# Patient Record
Sex: FEMALE | Race: WHITE | NOT HISPANIC OR LATINO | Employment: FULL TIME | RURAL
[De-identification: names, ages, dates, MRNs, and addresses within clinical notes are randomized per-mention and may not be internally consistent; named-entity substitution may affect disease eponyms.]

---

## 2022-04-06 ENCOUNTER — CLINICAL SUPPORT (OUTPATIENT)
Dept: FAMILY MEDICINE | Facility: CLINIC | Age: 52
End: 2022-04-06
Payer: COMMERCIAL

## 2022-04-06 ENCOUNTER — OFFICE VISIT (OUTPATIENT)
Dept: FAMILY MEDICINE | Facility: CLINIC | Age: 52
End: 2022-04-06
Payer: COMMERCIAL

## 2022-04-06 VITALS
TEMPERATURE: 99 F | HEART RATE: 82 BPM | WEIGHT: 198.38 LBS | SYSTOLIC BLOOD PRESSURE: 122 MMHG | HEIGHT: 63 IN | DIASTOLIC BLOOD PRESSURE: 77 MMHG | OXYGEN SATURATION: 98 % | BODY MASS INDEX: 35.15 KG/M2

## 2022-04-06 DIAGNOSIS — Z11.1 SCREENING EXAMINATION FOR PULMONARY TUBERCULOSIS: Primary | ICD-10-CM

## 2022-04-06 DIAGNOSIS — Z02.1 ENCOUNTER FOR PRE-EMPLOYMENT EXAMINATION: Primary | ICD-10-CM

## 2022-04-06 PROCEDURE — 3008F BODY MASS INDEX DOCD: CPT | Mod: CPTII,,, | Performed by: FAMILY MEDICINE

## 2022-04-06 PROCEDURE — 99499 UNLISTED E&M SERVICE: CPT | Mod: ,,, | Performed by: FAMILY MEDICINE

## 2022-04-06 PROCEDURE — 3078F DIAST BP <80 MM HG: CPT | Mod: CPTII,,, | Performed by: FAMILY MEDICINE

## 2022-04-06 PROCEDURE — 1160F PR REVIEW ALL MEDS BY PRESCRIBER/CLIN PHARMACIST DOCUMENTED: ICD-10-PCS | Mod: CPTII,,, | Performed by: FAMILY MEDICINE

## 2022-04-06 PROCEDURE — 3074F SYST BP LT 130 MM HG: CPT | Mod: CPTII,,, | Performed by: FAMILY MEDICINE

## 2022-04-06 PROCEDURE — 3074F PR MOST RECENT SYSTOLIC BLOOD PRESSURE < 130 MM HG: ICD-10-PCS | Mod: CPTII,,, | Performed by: FAMILY MEDICINE

## 2022-04-06 PROCEDURE — 1159F PR MEDICATION LIST DOCUMENTED IN MEDICAL RECORD: ICD-10-PCS | Mod: CPTII,,, | Performed by: FAMILY MEDICINE

## 2022-04-06 PROCEDURE — 1159F MED LIST DOCD IN RCRD: CPT | Mod: CPTII,,, | Performed by: FAMILY MEDICINE

## 2022-04-06 PROCEDURE — 99499 PR PHYSICAL - BASIC NON DOT/CDL: ICD-10-PCS | Mod: ,,, | Performed by: FAMILY MEDICINE

## 2022-04-06 PROCEDURE — 3008F PR BODY MASS INDEX (BMI) DOCUMENTED: ICD-10-PCS | Mod: CPTII,,, | Performed by: FAMILY MEDICINE

## 2022-04-06 PROCEDURE — 3078F PR MOST RECENT DIASTOLIC BLOOD PRESSURE < 80 MM HG: ICD-10-PCS | Mod: CPTII,,, | Performed by: FAMILY MEDICINE

## 2022-04-06 PROCEDURE — 1160F RVW MEDS BY RX/DR IN RCRD: CPT | Mod: CPTII,,, | Performed by: FAMILY MEDICINE

## 2022-04-06 RX ORDER — LORATADINE 10 MG/1
10 TABLET ORAL DAILY PRN
COMMUNITY

## 2022-04-18 PROCEDURE — 86580 POCT TB SKIN TEST: ICD-10-PCS | Mod: ,,, | Performed by: FAMILY MEDICINE

## 2022-04-18 PROCEDURE — 86580 TB INTRADERMAL TEST: CPT | Mod: ,,, | Performed by: FAMILY MEDICINE

## 2022-04-25 NOTE — PROGRESS NOTES
Elan Blanton MD   [facility]  [address] [place] [address]     PATIENT NAME: Leny Desir  : 1970  DATE: 22  MRN: [id number]      Billing Provider: Elan Blanton MD  Level of Service: [place] OFFICE/OUTPT VISIT, EST, LEVL III, 20-29 MIN  Patient PCP Information     Provider PCP Type    Elan Blanton MD General          Reason for Visit / Chief Complaint: Employment Physical (Needs physical and TB skin test)         History of Present Illness / Problem Focused Workflow     Leny Desir presents to the clinic with Employment Physical (Needs physical and TB skin test)     HPI    Review of Systems     Review of Systems   Constitutional: Negative for activity change, appetite change, fatigue and fever.   HENT: Negative for nasal congestion, ear pain, hearing loss, sinus pressure/congestion and sore throat.    Respiratory: Negative for cough, chest tightness and shortness of breath.    Cardiovascular: Negative for chest pain and palpitations.   Gastrointestinal: Negative for abdominal pain and fecal incontinence.   Genitourinary: Negative for bladder incontinence and difficulty urinating.   Musculoskeletal: Negative for arthralgias.   Integumentary:  Negative for rash.   Neurological: Negative for dizziness and headaches.        Medical / Social / Family History     Past Medical History:   Diagnosis Date    Allergy        Past Surgical History:   Procedure Laterality Date    HYSTERECTOMY         Social History  Leny Desir  reports that she has never smoked. She has never used smokeless tobacco. She reports that she does not drink alcohol and does not use drugs.    Family History  Leny Desir  family history includes Diabetes in her father; Hypertension in her mother.    Medications and Allergies     Medications  No outpatient medications have been marked as taking for the 22 encounter (Office Visit) with Elan Blanton MD.       Allergies  Review  of patient's allergies indicates:   Allergen Reactions    Prednisone     Rocephin [ceftriaxone]     Sulfa (sulfonamide antibiotics)        Physical Examination     Vitals:    04/06/22 1455   BP: 122/77   Pulse: 82   Temp: 98.7 °F (37.1 °C)     Physical Exam  Constitutional:       General: She is not in acute distress.     Appearance: She is not ill-appearing.   HENT:      Head: Normocephalic and atraumatic.      Right Ear: Tympanic membrane and ear canal normal.      Left Ear: Tympanic membrane and ear canal normal.      Nose: Nose normal. No congestion or rhinorrhea.   Eyes:      Pupils: Pupils are equal, round, and reactive to light.   Cardiovascular:      Rate and Rhythm: Normal rate and regular rhythm.      Pulses: Normal pulses.      Heart sounds: No murmur heard.  Pulmonary:      Effort: No respiratory distress.      Breath sounds: No wheezing, rhonchi or rales.   Abdominal:      General: Bowel sounds are normal.      Palpations: Abdomen is soft.      Tenderness: There is no abdominal tenderness.      Hernia: No hernia is present.   Musculoskeletal:      Cervical back: Normal range of motion and neck supple.   Lymphadenopathy:      Cervical: No cervical adenopathy.   Skin:     General: Skin is warm and dry.   Neurological:      Mental Status: She is alert.   Psychiatric:         Behavior: Behavior normal.         Thought Content: Thought content normal.          Assessment and Plan (including Health Maintenance)   :    Plan:         Health Maintenance Due   Topic Date Due    Hepatitis C Screening  Never done    Lipid Panel  Never done    COVID-19 Vaccine (1) Never done    HIV Screening  Never done    TETANUS VACCINE  Never done    Mammogram  Never done    Colorectal Cancer Screening  Never done    Influenza Vaccine (1) Never done    Shingles Vaccine (1 of 2) Never done       Problem List Items Addressed This Visit    None     Visit Diagnoses     Encounter for pre-employment examination    -   Primary        Encounter for pre-employment examination       The patient has no Health Maintenance topics of status Not Due    Procedures     No future appointments.     No follow-ups on file.       Signature:  Elan Blanton MD  [facility]  [address] [place] [address]  [phone] Phone  [phone] Fax    Date of encounter: 4/6/22

## 2023-03-15 ENCOUNTER — HOSPITAL ENCOUNTER (EMERGENCY)
Facility: HOSPITAL | Age: 53
Discharge: HOME | End: 2023-03-15
Attending: PEDIATRICS
Payer: COMMERCIAL

## 2023-03-15 VITALS
WEIGHT: 198.44 LBS | HEIGHT: 63 IN | SYSTOLIC BLOOD PRESSURE: 132 MMHG | HEART RATE: 69 BPM | RESPIRATION RATE: 18 BRPM | TEMPERATURE: 98 F | BODY MASS INDEX: 35.16 KG/M2 | OXYGEN SATURATION: 97 % | DIASTOLIC BLOOD PRESSURE: 68 MMHG

## 2023-03-15 DIAGNOSIS — R10.9 RIGHT FLANK PAIN: Primary | ICD-10-CM

## 2023-03-15 DIAGNOSIS — K59.00 CONSTIPATION, UNSPECIFIED CONSTIPATION TYPE: ICD-10-CM

## 2023-03-15 LAB
ALBUMIN SERPL BCP-MCNC: 3.8 G/DL (ref 3.5–5)
ALBUMIN/GLOB SERPL: 0.8 {RATIO}
ALP SERPL-CCNC: 111 U/L (ref 41–108)
ALT SERPL W P-5'-P-CCNC: 27 U/L (ref 13–56)
ANION GAP SERPL CALCULATED.3IONS-SCNC: 15 MMOL/L (ref 7–16)
AST SERPL W P-5'-P-CCNC: 21 U/L (ref 15–37)
BACTERIA #/AREA URNS HPF: ABNORMAL /HPF
BASOPHILS # BLD AUTO: 0.05 K/UL (ref 0–0.2)
BASOPHILS NFR BLD AUTO: 0.4 % (ref 0–1)
BILIRUB SERPL-MCNC: 0.2 MG/DL (ref ?–1.2)
BILIRUB UR QL STRIP: NEGATIVE
BUN SERPL-MCNC: 20 MG/DL (ref 7–18)
BUN/CREAT SERPL: 24 (ref 6–20)
CALCIUM SERPL-MCNC: 9.6 MG/DL (ref 8.5–10.1)
CHLORIDE SERPL-SCNC: 100 MMOL/L (ref 98–107)
CLARITY UR: CLEAR
CO2 SERPL-SCNC: 29 MMOL/L (ref 21–32)
COLOR UR: YELLOW
CREAT SERPL-MCNC: 0.83 MG/DL (ref 0.55–1.02)
DIFFERENTIAL METHOD BLD: ABNORMAL
EGFR (NO RACE VARIABLE) (RUSH/TITUS): 85 ML/MIN/1.73M²
EOSINOPHIL # BLD AUTO: 0.65 K/UL (ref 0–0.5)
EOSINOPHIL NFR BLD AUTO: 5.1 % (ref 1–4)
ERYTHROCYTE [DISTWIDTH] IN BLOOD BY AUTOMATED COUNT: 12.6 % (ref 11.5–14.5)
GLOBULIN SER-MCNC: 4.7 G/DL (ref 2–4)
GLUCOSE SERPL-MCNC: 76 MG/DL (ref 74–106)
GLUCOSE UR STRIP-MCNC: NEGATIVE MG/DL
HCT VFR BLD AUTO: 42.7 % (ref 38–47)
HGB BLD-MCNC: 14.1 G/DL (ref 12–16)
IMM GRANULOCYTES # BLD AUTO: 0.02 K/UL (ref 0–0.04)
IMM GRANULOCYTES NFR BLD: 0.2 % (ref 0–0.4)
KETONES UR STRIP-SCNC: NEGATIVE MG/DL
LEUKOCYTE ESTERASE UR QL STRIP: NEGATIVE
LYMPHOCYTES # BLD AUTO: 4.49 K/UL (ref 1–4.8)
LYMPHOCYTES NFR BLD AUTO: 35 % (ref 27–41)
MCH RBC QN AUTO: 29.7 PG (ref 27–31)
MCHC RBC AUTO-ENTMCNC: 33 G/DL (ref 32–36)
MCV RBC AUTO: 89.9 FL (ref 80–96)
MONOCYTES # BLD AUTO: 1.15 K/UL (ref 0–0.8)
MONOCYTES NFR BLD AUTO: 9 % (ref 2–6)
MPC BLD CALC-MCNC: 9.9 FL (ref 9.4–12.4)
MUCOUS THREADS #/AREA URNS HPF: ABNORMAL /HPF
NEUTROPHILS # BLD AUTO: 6.47 K/UL (ref 1.8–7.7)
NEUTROPHILS NFR BLD AUTO: 50.3 % (ref 53–65)
NITRITE UR QL STRIP: NEGATIVE
PH UR STRIP: 5.5 PH UNITS
PLATELET # BLD AUTO: 375 K/UL (ref 150–400)
POTASSIUM SERPL-SCNC: 3 MMOL/L (ref 3.5–5.1)
PROT SERPL-MCNC: 8.5 G/DL (ref 6.4–8.2)
PROT UR QL STRIP: NEGATIVE
RBC # BLD AUTO: 4.75 M/UL (ref 4.2–5.4)
RBC # UR STRIP: ABNORMAL /UL
RBC #/AREA URNS HPF: ABNORMAL /HPF
SODIUM SERPL-SCNC: 141 MMOL/L (ref 136–145)
SP GR UR STRIP: >=1.03
SQUAMOUS #/AREA URNS LPF: ABNORMAL /LPF
UROBILINOGEN UR STRIP-ACNC: 0.2 MG/DL
WBC # BLD AUTO: 12.83 K/UL (ref 4.5–11)
WBC #/AREA URNS HPF: ABNORMAL /HPF

## 2023-03-15 PROCEDURE — 80053 COMPREHEN METABOLIC PANEL: CPT | Performed by: PEDIATRICS

## 2023-03-15 PROCEDURE — 96375 TX/PRO/DX INJ NEW DRUG ADDON: CPT

## 2023-03-15 PROCEDURE — 99285 EMERGENCY DEPT VISIT HI MDM: CPT | Mod: 25

## 2023-03-15 PROCEDURE — 85025 COMPLETE CBC W/AUTO DIFF WBC: CPT | Performed by: PEDIATRICS

## 2023-03-15 PROCEDURE — 99284 PR EMERGENCY DEPT VISIT,LEVEL IV: ICD-10-PCS | Mod: ,,, | Performed by: PEDIATRICS

## 2023-03-15 PROCEDURE — 96374 THER/PROPH/DIAG INJ IV PUSH: CPT

## 2023-03-15 PROCEDURE — 63600175 PHARM REV CODE 636 W HCPCS: Mod: TB,JG | Performed by: PEDIATRICS

## 2023-03-15 PROCEDURE — 81001 URINALYSIS AUTO W/SCOPE: CPT | Performed by: PEDIATRICS

## 2023-03-15 PROCEDURE — 99284 EMERGENCY DEPT VISIT MOD MDM: CPT | Mod: ,,, | Performed by: PEDIATRICS

## 2023-03-15 RX ORDER — KETOROLAC TROMETHAMINE 30 MG/ML
30 INJECTION, SOLUTION INTRAMUSCULAR; INTRAVENOUS
Status: COMPLETED | OUTPATIENT
Start: 2023-03-15 | End: 2023-03-15

## 2023-03-15 RX ORDER — DIPHENHYDRAMINE HCL 25 MG
CAPSULE ORAL
COMMUNITY

## 2023-03-15 RX ORDER — MORPHINE SULFATE 2 MG/ML
1 INJECTION, SOLUTION INTRAMUSCULAR; INTRAVENOUS
Status: COMPLETED | OUTPATIENT
Start: 2023-03-15 | End: 2023-03-15

## 2023-03-15 RX ORDER — SOY ISOFLAV/BCOHOSH/CISSUS QUA 198 MG
CAPSULE ORAL
COMMUNITY

## 2023-03-15 RX ADMIN — KETOROLAC TROMETHAMINE 30 MG: 30 INJECTION, SOLUTION INTRAMUSCULAR; INTRAVENOUS at 07:03

## 2023-03-15 RX ADMIN — MORPHINE SULFATE 1 MG: 2 INJECTION, SOLUTION INTRAMUSCULAR; INTRAVENOUS at 08:03

## 2023-03-16 NOTE — ED TRIAGE NOTES
Presented to ER with right flank pain for past 8 days. Reports worse today and has a history of kidney stones.

## 2023-03-16 NOTE — ED PROVIDER NOTES
Encounter Date: 3/15/2023       History     Chief Complaint   Patient presents with    possible kidney stone    Flank Pain     Patient reports she is got a history of still kidney stones and passes a few a year.  She reports her stones are typically on the right.  Her symptoms currently are flank pain which is consistent with her stone history.  She is deny any blood in her urine that she is been able to see.  No vomiting no diarrhea no changing urinary habits.  No fevers.    Review of patient's allergies indicates:   Allergen Reactions    Prednisone     Rocephin [ceftriaxone]     Sulfa (sulfonamide antibiotics)      Past Medical History:   Diagnosis Date    Allergy      Past Surgical History:   Procedure Laterality Date    HYSTERECTOMY       Family History   Problem Relation Age of Onset    Hypertension Mother     Diabetes Father      Social History     Tobacco Use    Smoking status: Never    Smokeless tobacco: Never   Substance Use Topics    Alcohol use: Never    Drug use: Never     Review of Systems   Constitutional:  Negative for fever.   Respiratory:  Negative for cough, chest tightness and shortness of breath.    Gastrointestinal:  Negative for abdominal pain, diarrhea, nausea and vomiting.   Genitourinary:  Positive for flank pain.   All other systems reviewed and are negative.    Physical Exam     Initial Vitals [03/15/23 1910]   BP Pulse Resp Temp SpO2   (!) 149/78 78 20 98.1 °F (36.7 °C) 100 %      MAP       --         Physical Exam    Nursing note and vitals reviewed.  Constitutional: She appears well-developed and well-nourished. No distress.   Cardiovascular:  Normal rate, regular rhythm, normal heart sounds and intact distal pulses.           No murmur heard.  Pulmonary/Chest: Breath sounds normal. No respiratory distress.   Abdominal: Abdomen is soft. Bowel sounds are normal. She exhibits no distension. There is no rebound.     Neurological: She is alert and oriented to person, place, and time.    Skin: Skin is warm and dry. Capillary refill takes less than 2 seconds.   Psychiatric: She has a normal mood and affect. Her behavior is normal. Judgment and thought content normal.       Medical Screening Exam   See Full Note    ED Course   Procedures  Labs Reviewed   URINALYSIS, REFLEX TO URINE CULTURE - Abnormal; Notable for the following components:       Result Value    Blood, UA Trace-Intact (*)     Specific Gravity, UA >=1.030 (*)     All other components within normal limits   URINALYSIS, MICROSCOPIC - Abnormal; Notable for the following components:    Bacteria, UA Few (*)     Squamous Epithelial Cells, UA Moderate (*)     Mucus, UA Rare (*)     All other components within normal limits   COMPREHENSIVE METABOLIC PANEL - Abnormal; Notable for the following components:    Potassium 3.0 (*)     BUN 20 (*)     BUN/Creatinine Ratio 24 (*)     Total Protein 8.5 (*)     Globulin 4.7 (*)     Alk Phos 111 (*)     All other components within normal limits   CBC WITH DIFFERENTIAL - Abnormal; Notable for the following components:    WBC 12.83 (*)     Neutrophils % 50.3 (*)     Monocytes % 9.0 (*)     Eosinophils % 5.1 (*)     Monocytes, Absolute 1.15 (*)     Eosinophils, Absolute 0.65 (*)     All other components within normal limits   CBC W/ AUTO DIFFERENTIAL    Narrative:     The following orders were created for panel order CBC auto differential.  Procedure                               Abnormality         Status                     ---------                               -----------         ------                     CBC with Differential[[id number]]        Abnormal            Final result                 Please view results for these tests on the individual orders.          Imaging Results              CT Renal Stone Study ABD Pelvis WO (Final result)  Result time 03/15/23 20:04:48      Final result by Waylon Villela MD (03/15/23 20:04:48)                   Impression:      Nonobstructing left  nephrolithiasis    No hydronephrosis.  No radiopaque ureteral stone    Diverticulosis of the colon without tono diverticulitis      Electronically signed by: Waylon Larryjocelyne  Date:    03/15/2023  Time:    20:04               Narrative:    EXAMINATION:  CT ABDOMEN AND PELVIS without contrast    CLINICAL HISTORY:  Flank pain.  Kidney stone suspected    TECHNIQUE:  Axial CT images were obtained through the abdomen and pelvis without IV contrast.  Oral contrast was not given.  Coronal and sagittal reconstructions submitted and interpreted.  Total DLP  mGycm.  Automated exposure control utilized.    COMPARISON:  No previous similar CT available    FINDINGS:  CT abdomen:    Partially visualized lung bases are clear.  There is no gross pleural or pericardial effusion.    There is no evidence of pneumoperitoneum    Liver, bile ducts, spleen, pancreas, adrenal glands, and gallbladder are unremarkable in noncontrast CT appearance.    There is no hydronephrosis.  There is no radiopaque renal or ureteral stone on the right.  There are multiple punctate nonobstructing left renal stones.    There is no aneurysm of the abdominal aorta.    Stomach is moderately distended with ingested material.  Appendix is identified and appears normal.  There is no tono bowel obstruction.  There is diverticulosis of the colon without tono diverticulitis.  There is mild to moderate retained stool in the colon.    There is no tono lymphadenopathy by short-axis diameter criteria.    CT pelvis:    Urinary bladder is mildly distended.  There is no pelvic mass.  There has been apparent prior hysterectomy.    No acute osseous findings.                                       Medications   ketorolac injection 30 mg (30 mg Intravenous Given 3/15/23 1922)   morphine injection 1 mg (1 mg Intravenous Given 3/15/23 2010)     Medical Decision Making:   History:   I obtained history from: someone other than patient.       <> Summary of History: Patient has  been help with this story.  Patient reports flank pain  Initial Assessment:   Flank pain  Differential Diagnosis:   Kidney stone, constipation, diverticulitis, appendicitis,  Clinical Tests:   Lab Tests: Ordered and Reviewed  Radiological Study: Ordered and Reviewed  ED Management:  Patient given medication for the pain.  CT was unremarkable.  Patient advised on treatment of constipation to see if this helped with her flank pain                 Clinical Impression:   Final diagnoses:  [R10.9] Right flank pain (Primary)  [K59.00] Constipation, unspecified constipation type        ED Disposition Condition    Discharge Stable          ED Prescriptions    None       Follow-up Information       Follow up With Specialties Details Why Contact Info    Elan Blanton MD Family Medicine In 3 days  [address] [facility] [address]  [phone]               Flo Londono MD  03/15/23 7292       Flo Londono MD  04/15/23 1018

## 2023-03-30 ENCOUNTER — OFFICE VISIT (OUTPATIENT)
Dept: FAMILY MEDICINE | Facility: CLINIC | Age: 53
End: 2023-03-30
Payer: COMMERCIAL

## 2023-03-30 VITALS
SYSTOLIC BLOOD PRESSURE: 104 MMHG | WEIGHT: 195.38 LBS | BODY MASS INDEX: 34.62 KG/M2 | OXYGEN SATURATION: 97 % | HEART RATE: 74 BPM | HEIGHT: 63 IN | TEMPERATURE: 98 F | DIASTOLIC BLOOD PRESSURE: 78 MMHG

## 2023-03-30 DIAGNOSIS — I10 HYPERTENSION, UNSPECIFIED TYPE: ICD-10-CM

## 2023-03-30 DIAGNOSIS — Z11.1 SCREENING FOR TUBERCULOSIS: ICD-10-CM

## 2023-03-30 DIAGNOSIS — Z12.11 COLON CANCER SCREENING: ICD-10-CM

## 2023-03-30 DIAGNOSIS — L65.9 HAIR LOSS: Primary | ICD-10-CM

## 2023-03-30 DIAGNOSIS — Z13.220 LIPID SCREENING: ICD-10-CM

## 2023-03-30 DIAGNOSIS — Z13.1 SCREENING FOR DIABETES MELLITUS: ICD-10-CM

## 2023-03-30 LAB
ANION GAP SERPL CALCULATED.3IONS-SCNC: 10 MMOL/L (ref 7–16)
BUN SERPL-MCNC: 15 MG/DL (ref 7–18)
BUN/CREAT SERPL: 24 (ref 6–20)
CALCIUM SERPL-MCNC: 10.1 MG/DL (ref 8.5–10.1)
CHLORIDE SERPL-SCNC: 104 MMOL/L (ref 98–107)
CHOLEST SERPL-MCNC: 220 MG/DL (ref 0–200)
CHOLEST/HDLC SERPL: 3 {RATIO}
CO2 SERPL-SCNC: 28 MMOL/L (ref 21–32)
CREAT SERPL-MCNC: 0.62 MG/DL (ref 0.55–1.02)
EGFR (NO RACE VARIABLE) (RUSH/TITUS): 108 ML/MIN/1.73M²
GLUCOSE SERPL-MCNC: 100 MG/DL (ref 74–106)
HDLC SERPL-MCNC: 74 MG/DL (ref 40–60)
LDLC SERPL CALC-MCNC: 132 MG/DL
LDLC/HDLC SERPL: 1.8 {RATIO}
NONHDLC SERPL-MCNC: 146 MG/DL
POTASSIUM SERPL-SCNC: 4.3 MMOL/L (ref 3.5–5.1)
SODIUM SERPL-SCNC: 138 MMOL/L (ref 136–145)
T4 FREE SERPL-MCNC: 0.92 NG/DL (ref 0.76–1.46)
TRIGL SERPL-MCNC: 69 MG/DL (ref 35–150)
TSH SERPL DL<=0.005 MIU/L-ACNC: 1.6 UIU/ML (ref 0.36–3.74)
VLDLC SERPL-MCNC: 14 MG/DL

## 2023-03-30 PROCEDURE — 3008F BODY MASS INDEX DOCD: CPT | Mod: CPTII,,, | Performed by: FAMILY MEDICINE

## 2023-03-30 PROCEDURE — 80061 LIPID PANEL: ICD-10-PCS | Mod: ,,, | Performed by: CLINICAL MEDICAL LABORATORY

## 2023-03-30 PROCEDURE — 3078F DIAST BP <80 MM HG: CPT | Mod: CPTII,,, | Performed by: FAMILY MEDICINE

## 2023-03-30 PROCEDURE — 1159F MED LIST DOCD IN RCRD: CPT | Mod: CPTII,,, | Performed by: FAMILY MEDICINE

## 2023-03-30 PROCEDURE — 84439 ASSAY OF FREE THYROXINE: CPT | Mod: ,,, | Performed by: CLINICAL MEDICAL LABORATORY

## 2023-03-30 PROCEDURE — 80048 BASIC METABOLIC PANEL: ICD-10-PCS | Mod: ,,, | Performed by: CLINICAL MEDICAL LABORATORY

## 2023-03-30 PROCEDURE — 3044F PR MOST RECENT HEMOGLOBIN A1C LEVEL <7.0%: ICD-10-PCS | Mod: CPTII,,, | Performed by: FAMILY MEDICINE

## 2023-03-30 PROCEDURE — 86580 TB INTRADERMAL TEST: CPT | Mod: ,,, | Performed by: FAMILY MEDICINE

## 2023-03-30 PROCEDURE — 99214 PR OFFICE/OUTPT VISIT, EST, LEVL IV, 30-39 MIN: ICD-10-PCS | Mod: ,,, | Performed by: FAMILY MEDICINE

## 2023-03-30 PROCEDURE — 84439 T4, FREE: ICD-10-PCS | Mod: ,,, | Performed by: CLINICAL MEDICAL LABORATORY

## 2023-03-30 PROCEDURE — 80048 BASIC METABOLIC PNL TOTAL CA: CPT | Mod: ,,, | Performed by: CLINICAL MEDICAL LABORATORY

## 2023-03-30 PROCEDURE — 1159F PR MEDICATION LIST DOCUMENTED IN MEDICAL RECORD: ICD-10-PCS | Mod: CPTII,,, | Performed by: FAMILY MEDICINE

## 2023-03-30 PROCEDURE — 86580 POCT TB SKIN TEST: ICD-10-PCS | Mod: ,,, | Performed by: FAMILY MEDICINE

## 2023-03-30 PROCEDURE — 3008F PR BODY MASS INDEX (BMI) DOCUMENTED: ICD-10-PCS | Mod: CPTII,,, | Performed by: FAMILY MEDICINE

## 2023-03-30 PROCEDURE — 84443 TSH: ICD-10-PCS | Mod: ,,, | Performed by: CLINICAL MEDICAL LABORATORY

## 2023-03-30 PROCEDURE — 80061 LIPID PANEL: CPT | Mod: ,,, | Performed by: CLINICAL MEDICAL LABORATORY

## 2023-03-30 PROCEDURE — 3074F SYST BP LT 130 MM HG: CPT | Mod: CPTII,,, | Performed by: FAMILY MEDICINE

## 2023-03-30 PROCEDURE — 3074F PR MOST RECENT SYSTOLIC BLOOD PRESSURE < 130 MM HG: ICD-10-PCS | Mod: CPTII,,, | Performed by: FAMILY MEDICINE

## 2023-03-30 PROCEDURE — 3044F HG A1C LEVEL LT 7.0%: CPT | Mod: CPTII,,, | Performed by: FAMILY MEDICINE

## 2023-03-30 PROCEDURE — 83036 HEMOGLOBIN GLYCOSYLATED A1C: CPT | Mod: ,,, | Performed by: CLINICAL MEDICAL LABORATORY

## 2023-03-30 PROCEDURE — 83036 HEMOGLOBIN A1C: ICD-10-PCS | Mod: ,,, | Performed by: CLINICAL MEDICAL LABORATORY

## 2023-03-30 PROCEDURE — 3078F PR MOST RECENT DIASTOLIC BLOOD PRESSURE < 80 MM HG: ICD-10-PCS | Mod: CPTII,,, | Performed by: FAMILY MEDICINE

## 2023-03-30 PROCEDURE — 99214 OFFICE O/P EST MOD 30 MIN: CPT | Mod: ,,, | Performed by: FAMILY MEDICINE

## 2023-03-30 PROCEDURE — 84443 ASSAY THYROID STIM HORMONE: CPT | Mod: ,,, | Performed by: CLINICAL MEDICAL LABORATORY

## 2023-03-30 NOTE — PROGRESS NOTES
Elan Blanton MD   [facility]  [address] [place] [address]     PATIENT NAME: Leny Desir  : 1970  DATE: 3/30/23  MRN: [id number]      Billing Provider: Elan Blanton MD  Level of Service: [place] OFFICE/OUTPT VISIT, EST, LEVL IV, 30-39 MIN  Patient PCP Information       Provider PCP Type    Elan Blanton MD General            Reason for Visit / Chief Complaint: Annual Exam (Health Physical. ), Hypertension (Discuss BP readings. Headache started a couple of days before ER Visit on 3/15/23 for right sided flank pain. BP readings monitored and readings fluctuating. Patient reports K+- 3.0 at ER visit. Taking OTC Potassium since ER visit. ), and Hair Loss (Hair loss and concerned of thyroid. )         History of Present Illness / Problem Focused Workflow     Leny Desir presents to the clinic with Annual Exam (Health Physical. ), Hypertension (Discuss BP readings. Headache started a couple of days before ER Visit on 3/15/23 for right sided flank pain. BP readings monitored and readings fluctuating. Patient reports K+- 3.0 at ER visit. Taking OTC Potassium since ER visit. ), and Hair Loss (Hair loss and concerned of thyroid. )     HPI    Review of Systems     Review of Systems   Constitutional:  Negative for activity change, appetite change, fatigue and fever.   HENT:  Negative for nasal congestion, ear pain, hearing loss, sinus pressure/congestion and sore throat.    Respiratory:  Negative for cough, chest tightness and shortness of breath.    Cardiovascular:  Negative for chest pain and palpitations.   Gastrointestinal:  Negative for abdominal pain and fecal incontinence.   Genitourinary:  Negative for bladder incontinence and difficulty urinating.   Musculoskeletal:  Negative for arthralgias.   Integumentary:  Negative for rash.   Neurological:  Negative for dizziness and headaches.      Medical / Social / Family History     Past Medical History:   Diagnosis  Date    Allergy        Past Surgical History:   Procedure Laterality Date    HYSTERECTOMY      TUBAL LIGATION         Social History  Leny Desir  reports that she has never smoked. She has never used smokeless tobacco. She reports that she does not drink alcohol and does not use drugs.    Family History  Leny Desir  family history includes Diabetes in her father; Hypertension in her mother.    Medications and Allergies     Medications  Outpatient Medications Marked as Taking for the 3/30/23 encounter (Office Visit) with Elan Blanton MD   Medication Sig Dispense Refill    diphenhydrAMINE (BENADRYL) 25 mg capsule       loratadine (CLARITIN) 10 mg tablet Take 10 mg by mouth daily as needed for Allergies.      rhubarb root extract (ESTROVEN CMPLT MENOPAUSE RLF) 4 mg Tab          Allergies  Review of patient's allergies indicates:   Allergen Reactions    Prednisone     Rocephin [ceftriaxone]     Sulfa (sulfonamide antibiotics)        Physical Examination     Vitals:    03/30/23 0901   BP: 104/78   Pulse: 74   Temp: 98.3 °F (36.8 °C)     Physical Exam  Constitutional:       General: She is not in acute distress.     Appearance: She is not ill-appearing.   HENT:      Head: Normocephalic and atraumatic.      Right Ear: Tympanic membrane and ear canal normal.      Left Ear: Tympanic membrane and ear canal normal.      Nose: Nose normal. No congestion or rhinorrhea.   Eyes:      Pupils: Pupils are equal, round, and reactive to light.   Cardiovascular:      Rate and Rhythm: Normal rate and regular rhythm.      Pulses: Normal pulses.      Heart sounds: No murmur heard.  Pulmonary:      Effort: No respiratory distress.      Breath sounds: No wheezing, rhonchi or rales.   Abdominal:      General: Bowel sounds are normal.      Palpations: Abdomen is soft.      Tenderness: There is no abdominal tenderness.      Hernia: No hernia is present.   Musculoskeletal:      Cervical back: Normal range of motion and neck supple.    Lymphadenopathy:      Cervical: No cervical adenopathy.   Skin:     General: Skin is warm and dry.   Neurological:      Mental Status: She is alert.   Psychiatric:         Behavior: Behavior normal.         Thought Content: Thought content normal.        Assessment and Plan (including Health Maintenance)   :    Plan:         Health Maintenance Due   Topic Date Due    Hepatitis C Screening  Never done    COVID-19 Vaccine (1) Never done    HIV Screening  Never done    Mammogram  Never done    Colorectal Cancer Screening  Never done    Shingles Vaccine (1 of 2) Never done    Influenza Vaccine (1) Never done       Problem List Items Addressed This Visit    None  Visit Diagnoses       Hair loss    -  Primary    Relevant Orders    T4, Free (Completed)    TSH (Completed)    Lipid screening        Relevant Orders    Lipid Panel (Completed)    Screening for diabetes mellitus        Relevant Orders    Hemoglobin A1C (Completed)    Hypertension, unspecified type        Relevant Orders    Basic Metabolic Panel (Completed)    Colon cancer screening        Relevant Orders    Cologuard Screening (Multitarget Stool DNA)    Screening for tuberculosis        Relevant Orders    POCT TB Skin Test Read (Completed)          Hair loss  -     T4, Free; Future; Expected date: 03/30/2023  -     TSH; Future; Expected date: 03/30/2023    Lipid screening  -     Lipid Panel; Future; Expected date: 03/30/2023    Screening for diabetes mellitus  -     Hemoglobin A1C; Future; Expected date: 03/30/2023    Hypertension, unspecified type  -     Basic Metabolic Panel; Future; Expected date: 03/30/2023    Colon cancer screening  -     Cologuard Screening (Multitarget Stool DNA); Future; Expected date: 03/30/2023    Screening for tuberculosis  -     POCT TB Skin Test Read       Health Maintenance Topics with due status: Not Due       Topic Last Completion Date    TETANUS VACCINE 12/22/2020    Hemoglobin A1c (Diabetic Prevention Screening) 03/30/2023     Lipid Panel 03/30/2023       Procedures     No future appointments.     No follow-ups on file.       Signature:  Elan Blanton MD  [facility]  [address] [place] [address]  [phone] Phone  [phone] Fax    Date of encounter: 3/30/23

## 2023-03-30 NOTE — LETTER
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 The results of this test are as follows:    PPD Test Value    No lab values to display.       Current PPD Immunization     Name Date Dose VIS Date Route    PPD Test 3/30/2023 0.1 mL N/A Intradermal    Site: Left arm    Given By: Justine Maldonado LPN    : [employer]    Lot: [id number]    Expiration Date: 6/1/2025    PPD Test 4/18/2022 0.1 mL N/A Intradermal    Site: Left arm    Given By: Justine Maldonado LPN    : [employer]    Lot: [id number]    Expiration Date: 3/17/2023       {was/not:[id number]::"was not"}  If you have any questions or concerns, please don't hesitate to call.    Sincerely,    Elan Blanton MD     "

## 2023-03-30 NOTE — PROGRESS NOTES
Elan Blanton MD   [facility]  [address] [place] [address]     PATIENT NAME: Leny Desir  : 1970  DATE: 3/30/23  MRN: [id number]      Billing Provider: Elan Blanton MD  Level of Service:   Patient PCP Information       Provider PCP Type    Elan Blanton MD General            Reason for Visit / Chief Complaint: Annual Exam (Health Physical. ), Hypertension (Discuss BP readings. Headache started a couple of days before ER Visit on 3/15/23 for right sided flank pain. BP readings monitored and readings fluctuating. Patient reports K+- 3.0 at ER visit. Taking OTC Potassium since ER visit. ), and Hair Loss (Hair loss and concerned of thyroid. )         History of Present Illness / Problem Focused Workflow     Leny Desir presents to the clinic with Annual Exam (Health Physical. ), Hypertension (Discuss BP readings. Headache started a couple of days before ER Visit on 3/15/23 for right sided flank pain. BP readings monitored and readings fluctuating. Patient reports K+- 3.0 at ER visit. Taking OTC Potassium since ER visit. ), and Hair Loss (Hair loss and concerned of thyroid. )     HPI    Review of Systems     Review of Systems     Medical / Social / Family History     Past Medical History:   Diagnosis Date    Allergy        Past Surgical History:   Procedure Laterality Date    HYSTERECTOMY      TUBAL LIGATION         Social History  Leny Desir  reports that she has never smoked. She has never used smokeless tobacco. She reports that she does not drink alcohol and does not use drugs.    Family History  Leny Desir  family history includes Diabetes in her father; Hypertension in her mother.    Medications and Allergies     Medications  Outpatient Medications Marked as Taking for the 3/30/23 encounter (Office Visit) with Elan Blanton MD   Medication Sig Dispense Refill    diphenhydrAMINE (BENADRYL) 25 mg capsule       loratadine (CLARITIN) 10 mg  tablet Take 10 mg by mouth daily as needed for Allergies.      rhubarb root extract (ESTROVEN CMPLT MENOPAUSE RLF) 4 mg Tab          Allergies  Review of patient's allergies indicates:   Allergen Reactions    Prednisone     Rocephin [ceftriaxone]     Sulfa (sulfonamide antibiotics)        Physical Examination     Vitals:    03/30/23 0901   BP: 104/78   Pulse: 74   Temp: 98.3 °F (36.8 °C)     Physical Exam     Assessment and Plan (including Health Maintenance)   :    Plan:         Health Maintenance Due   Topic Date Due    Hepatitis C Screening  Never done    Lipid Panel  Never done    COVID-19 Vaccine (1) Never done    HIV Screening  Never done    Mammogram  Never done    Hemoglobin A1c (Diabetic Prevention Screening)  Never done    Colorectal Cancer Screening  Never done    Shingles Vaccine (1 of 2) Never done    Influenza Vaccine (1) Never done       Problem List Items Addressed This Visit    None  Visit Diagnoses       Hair loss    -  Primary    Relevant Orders    T4, Free    TSH    Lipid screening        Relevant Orders    Lipid Panel    Screening for diabetes mellitus        Relevant Orders    Hemoglobin A1C    Hypertension, unspecified type        Relevant Orders    Basic Metabolic Panel    Colon cancer screening        Relevant Orders    Cologuard Screening (Multitarget Stool DNA)          Hair loss  -     T4, Free; Future; Expected date: 03/30/2023  -     TSH; Future; Expected date: 03/30/2023    Lipid screening  -     Lipid Panel; Future; Expected date: 03/30/2023    Screening for diabetes mellitus  -     Hemoglobin A1C; Future; Expected date: 03/30/2023    Hypertension, unspecified type  -     Basic Metabolic Panel; Future; Expected date: 03/30/2023    Colon cancer screening  -     Cologuard Screening (Multitarget Stool DNA); Future; Expected date: 03/30/2023       Health Maintenance Topics with due status: Not Due       Topic Last Completion Date    TETANUS VACCINE 12/22/2020       Procedures     No  future appointments.     No follow-ups on file.       Signature:  Elan Blanton MD  [facility]  [address] [place] [address]  [phone] Phone  [phone] Fax    Date of encounter: 3/30/23

## 2023-03-31 LAB
EST. AVERAGE GLUCOSE BLD GHB EST-MCNC: 100 MG/DL
HBA1C MFR BLD HPLC: 5.6 % (ref 4.5–6.6)

## 2023-04-15 LAB — NONINV COLON CA DNA+OCC BLD SCRN STL QL: NEGATIVE

## 2023-06-08 ENCOUNTER — OFFICE VISIT (OUTPATIENT)
Dept: FAMILY MEDICINE | Facility: CLINIC | Age: 53
End: 2023-06-08
Payer: COMMERCIAL

## 2023-06-08 VITALS
SYSTOLIC BLOOD PRESSURE: 122 MMHG | TEMPERATURE: 99 F | WEIGHT: 202 LBS | HEART RATE: 95 BPM | BODY MASS INDEX: 35.79 KG/M2 | OXYGEN SATURATION: 82 % | DIASTOLIC BLOOD PRESSURE: 84 MMHG | HEIGHT: 63 IN

## 2023-06-08 DIAGNOSIS — R05.9 COUGH, UNSPECIFIED TYPE: ICD-10-CM

## 2023-06-08 DIAGNOSIS — J01.00 ACUTE NON-RECURRENT MAXILLARY SINUSITIS: Primary | ICD-10-CM

## 2023-06-08 PROCEDURE — 3074F SYST BP LT 130 MM HG: CPT | Mod: CPTII,,, | Performed by: FAMILY MEDICINE

## 2023-06-08 PROCEDURE — 3044F HG A1C LEVEL LT 7.0%: CPT | Mod: CPTII,,, | Performed by: FAMILY MEDICINE

## 2023-06-08 PROCEDURE — 99213 OFFICE O/P EST LOW 20 MIN: CPT | Mod: 25,,, | Performed by: FAMILY MEDICINE

## 2023-06-08 PROCEDURE — 1159F MED LIST DOCD IN RCRD: CPT | Mod: CPTII,,, | Performed by: FAMILY MEDICINE

## 2023-06-08 PROCEDURE — 3044F PR MOST RECENT HEMOGLOBIN A1C LEVEL <7.0%: ICD-10-PCS | Mod: CPTII,,, | Performed by: FAMILY MEDICINE

## 2023-06-08 PROCEDURE — 3079F DIAST BP 80-89 MM HG: CPT | Mod: CPTII,,, | Performed by: FAMILY MEDICINE

## 2023-06-08 PROCEDURE — 1159F PR MEDICATION LIST DOCUMENTED IN MEDICAL RECORD: ICD-10-PCS | Mod: CPTII,,, | Performed by: FAMILY MEDICINE

## 2023-06-08 PROCEDURE — 3074F PR MOST RECENT SYSTOLIC BLOOD PRESSURE < 130 MM HG: ICD-10-PCS | Mod: CPTII,,, | Performed by: FAMILY MEDICINE

## 2023-06-08 PROCEDURE — 96372 THER/PROPH/DIAG INJ SC/IM: CPT | Mod: ,,, | Performed by: FAMILY MEDICINE

## 2023-06-08 PROCEDURE — 3079F PR MOST RECENT DIASTOLIC BLOOD PRESSURE 80-89 MM HG: ICD-10-PCS | Mod: CPTII,,, | Performed by: FAMILY MEDICINE

## 2023-06-08 PROCEDURE — 99213 PR OFFICE/OUTPT VISIT, EST, LEVL III, 20-29 MIN: ICD-10-PCS | Mod: 25,,, | Performed by: FAMILY MEDICINE

## 2023-06-08 PROCEDURE — 96372 PR INJECTION,THERAP/PROPH/DIAG2ST, IM OR SUBCUT: ICD-10-PCS | Mod: ,,, | Performed by: FAMILY MEDICINE

## 2023-06-08 PROCEDURE — 3008F BODY MASS INDEX DOCD: CPT | Mod: CPTII,,, | Performed by: FAMILY MEDICINE

## 2023-06-08 PROCEDURE — 3008F PR BODY MASS INDEX (BMI) DOCUMENTED: ICD-10-PCS | Mod: CPTII,,, | Performed by: FAMILY MEDICINE

## 2023-06-08 RX ORDER — LINCOMYCIN HYDROCHLORIDE 300 MG/ML
600 INJECTION, SOLUTION INTRAMUSCULAR; INTRAVENOUS; SUBCONJUNCTIVAL
Status: COMPLETED | OUTPATIENT
Start: 2023-06-08 | End: 2023-06-08

## 2023-06-08 RX ORDER — FLUTICASONE PROPIONATE 50 MCG
1 SPRAY, SUSPENSION (ML) NASAL DAILY
Qty: 16 G | Refills: 0 | Status: SHIPPED | OUTPATIENT
Start: 2023-06-08

## 2023-06-08 RX ORDER — AZITHROMYCIN 250 MG/1
TABLET, FILM COATED ORAL
Qty: 6 TABLET | Refills: 0 | Status: SHIPPED | OUTPATIENT
Start: 2023-06-08

## 2023-06-08 RX ADMIN — LINCOMYCIN HYDROCHLORIDE 600 MG: 300 INJECTION, SOLUTION INTRAMUSCULAR; INTRAVENOUS; SUBCONJUNCTIVAL at 09:06

## 2023-06-08 NOTE — PROGRESS NOTES
Elan Blanton MD   [facility]  [address] [place] [address]     PATIENT NAME: Leny Desir  : 1970  DATE: 23  MRN: [id number]      Billing Provider: Elan Blanton MD  Level of Service: [place] OFFICE/OUTPT VISIT, EST, LEVL III, 20-29 MIN  Patient PCP Information       Provider PCP Type    Elan Blanton MD General            Reason for Visit / Chief Complaint: Sinusitis (Cough, sore throat, ear pressure, PN drainage.  that started last Friday. Use of OTC allergy meds. )         History of Present Illness / Problem Focused Workflow     Leny Desir presents to the clinic with Sinusitis (Cough, sore throat, ear pressure, PN drainage.  that started last Friday. Use of OTC allergy meds. )     HPI    Review of Systems     Review of Systems   Constitutional:  Negative for activity change, appetite change, fatigue and fever.   HENT:  Positive for nasal congestion, sinus pressure/congestion and sore throat. Negative for ear pain and hearing loss.    Respiratory:  Positive for cough. Negative for chest tightness and shortness of breath.    Cardiovascular:  Negative for chest pain and palpitations.   Gastrointestinal:  Negative for abdominal pain and fecal incontinence.   Genitourinary:  Negative for bladder incontinence and difficulty urinating.   Musculoskeletal:  Negative for arthralgias.   Integumentary:  Negative for rash.   Neurological:  Negative for dizziness and headaches.      Medical / Social / Family History     Past Medical History:   Diagnosis Date    Allergy        Past Surgical History:   Procedure Laterality Date    HYSTERECTOMY      TUBAL LIGATION         Social History  Leny Desir  reports that she has never smoked. She has never used smokeless tobacco. She reports that she does not drink alcohol and does not use drugs.    Family History  Leny Desir  family history includes Diabetes in her father; Hypertension in her mother.    Medications and  Allergies     Medications  Outpatient Medications Marked as Taking for the 6/8/23 encounter (Office Visit) with Elan Blanton MD   Medication Sig Dispense Refill    diphenhydrAMINE (BENADRYL) 25 mg capsule       loratadine (CLARITIN) 10 mg tablet Take 10 mg by mouth daily as needed for Allergies.      rhubarb root extract (ESTROVEN CMPLT MENOPAUSE RLF) 4 mg Tab        Current Facility-Administered Medications for the 6/8/23 encounter (Office Visit) with Elan Blanton MD   Medication Dose Route Frequency Provider Last Rate Last Admin    [COMPLETED] lincomycin injection 600 mg  600 mg Intramuscular 1 time in Clinic/HOD Elan Blanton MD   600 mg at 06/08/23 0913       Allergies  Review of patient's allergies indicates:   Allergen Reactions    Prednisone     Rocephin [ceftriaxone]     Sulfa (sulfonamide antibiotics)        Physical Examination     Vitals:    06/08/23 0750   BP: 122/84   Pulse: 95   Temp: 99 °F (37.2 °C)     Physical Exam  Constitutional:       Appearance: Normal appearance.   HENT:      Head: Normocephalic and atraumatic.      Right Ear: Tympanic membrane normal.      Left Ear: Tympanic membrane normal.      Nose: Congestion and rhinorrhea present.      Mouth/Throat:      Pharynx: Posterior oropharyngeal erythema present.   Eyes:      Pupils: Pupils are equal, round, and reactive to light.   Cardiovascular:      Rate and Rhythm: Normal rate and regular rhythm.      Pulses: Normal pulses.      Heart sounds: Normal heart sounds.   Pulmonary:      Breath sounds: No wheezing or rhonchi.   Abdominal:      Palpations: Abdomen is soft.   Lymphadenopathy:      Cervical: Cervical adenopathy present.   Skin:     General: Skin is warm and dry.   Neurological:      Mental Status: She is alert.        Assessment and Plan (including Health Maintenance)   :    Plan:         Health Maintenance Due   Topic Date Due    Hepatitis C Screening  Never done    COVID-19 Vaccine (1) Never done    HIV  Screening  Never done    Mammogram  Never done    Shingles Vaccine (1 of 2) Never done       Problem List Items Addressed This Visit    None  Visit Diagnoses       Acute non-recurrent maxillary sinusitis    -  Primary    Relevant Medications    lincomycin injection 600 mg (Completed)    azithromycin (Z-JOYCE) 250 MG tablet    fluticasone propionate (FLONASE) 50 mcg/actuation nasal spray    Cough, unspecified type              Acute non-recurrent maxillary sinusitis  -     lincomycin injection 600 mg  -     azithromycin (Z-JOYCE) 250 MG tablet; Take 2 tablets by mouth on day 1; Take 1 tablet by mouth on days 2-5  Dispense: 6 tablet; Refill: 0  -     fluticasone propionate (FLONASE) 50 mcg/actuation nasal spray; 1 spray (50 mcg total) by Each Nostril route once daily.  Dispense: 16 g; Refill: 0    Cough, unspecified type       Health Maintenance Topics with due status: Not Due       Topic Last Completion Date    TETANUS VACCINE 12/22/2020    Hemoglobin A1c (Diabetic Prevention Screening) 03/30/2023    Lipid Panel 03/30/2023    Colorectal Cancer Screening 04/09/2023    Influenza Vaccine Not Due       Procedures     No future appointments.     No follow-ups on file.       Signature:  Elan Blanton MD  [facility]  [address] [place] [address]  [phone] Phone  [phone] Fax    Date of encounter: 6/8/23

## 2023-06-08 NOTE — PROGRESS NOTES
Elan Blanton MD   [facility]  [address] [place] [address]     PATIENT NAME: Leny Desir  : 1970  DATE: 23  MRN: [id number]      Billing Provider: Elan Blanton MD  Level of Service:   Patient PCP Information       Provider PCP Type    Elan Blanton MD General            Reason for Visit / Chief Complaint: Sinusitis (Cough, sore throat, ear pressure, PN drainage.  that started last Friday. Use of OTC allergy meds. )         History of Present Illness / Problem Focused Workflow     Leny Desir presents to the clinic with Sinusitis (Cough, sore throat, ear pressure, PN drainage.  that started last Friday. Use of OTC allergy meds. )     HPI    Review of Systems     Review of Systems   Constitutional:  Negative for activity change, appetite change, fatigue and fever.   HENT:  Positive for nasal congestion, sinus pressure/congestion and sore throat. Negative for ear pain and hearing loss.    Respiratory:  Positive for cough. Negative for chest tightness and shortness of breath.    Cardiovascular:  Negative for chest pain and palpitations.   Gastrointestinal:  Negative for abdominal pain and fecal incontinence.   Genitourinary:  Negative for bladder incontinence and difficulty urinating.   Musculoskeletal:  Negative for arthralgias.   Integumentary:  Negative for rash.   Neurological:  Negative for dizziness and headaches.      Medical / Social / Family History     Past Medical History:   Diagnosis Date    Allergy        Past Surgical History:   Procedure Laterality Date    HYSTERECTOMY      TUBAL LIGATION         Social History  Leny Desir  reports that she has never smoked. She has never used smokeless tobacco. She reports that she does not drink alcohol and does not use drugs.    Family History  Leny Desir  family history includes Diabetes in her father; Hypertension in her mother.    Medications and Allergies     Medications  Outpatient  Medications Marked as Taking for the 6/8/23 encounter (Office Visit) with Elan Blanton MD   Medication Sig Dispense Refill    diphenhydrAMINE (BENADRYL) 25 mg capsule       loratadine (CLARITIN) 10 mg tablet Take 10 mg by mouth daily as needed for Allergies.      rhubarb root extract (ESTROVEN CMPLT MENOPAUSE RLF) 4 mg Tab          Allergies  Review of patient's allergies indicates:   Allergen Reactions    Prednisone     Rocephin [ceftriaxone]     Sulfa (sulfonamide antibiotics)        Physical Examination     Vitals:    06/08/23 0750   BP: 122/84   Pulse: 95   Temp: 99 °F (37.2 °C)     Physical Exam  Constitutional:       Appearance: Normal appearance.   HENT:      Head: Normocephalic and atraumatic.      Right Ear: Tympanic membrane normal.      Left Ear: Tympanic membrane normal.      Nose: Congestion and rhinorrhea present.      Mouth/Throat:      Pharynx: Posterior oropharyngeal erythema present.   Eyes:      Pupils: Pupils are equal, round, and reactive to light.   Cardiovascular:      Rate and Rhythm: Normal rate and regular rhythm.      Pulses: Normal pulses.      Heart sounds: Normal heart sounds.   Pulmonary:      Breath sounds: No wheezing or rhonchi.   Abdominal:      Palpations: Abdomen is soft.   Lymphadenopathy:      Cervical: Cervical adenopathy present.   Skin:     General: Skin is warm and dry.   Neurological:      Mental Status: She is alert.        Assessment and Plan (including Health Maintenance)   :    Plan:         Health Maintenance Due   Topic Date Due    Hepatitis C Screening  Never done    COVID-19 Vaccine (1) Never done    HIV Screening  Never done    Mammogram  Never done    Shingles Vaccine (1 of 2) Never done       Problem List Items Addressed This Visit    None    There are no diagnoses linked to this encounter.   Health Maintenance Topics with due status: Not Due       Topic Last Completion Date    TETANUS VACCINE 12/22/2020    Hemoglobin A1c (Diabetic Prevention  Screening) 03/30/2023    Lipid Panel 03/30/2023    Colorectal Cancer Screening 04/09/2023    Influenza Vaccine Not Due       Procedures     No future appointments.     No follow-ups on file.       Signature:  Elan Blanton MD  [facility]  [address] [place] [address]  [phone] Phone  [phone] Fax    Date of encounter: 6/8/23

## 2024-08-28 ENCOUNTER — PATIENT MESSAGE (OUTPATIENT)
Dept: ADMINISTRATIVE | Facility: HOSPITAL | Age: 54
End: 2024-08-28

## 2024-09-03 ENCOUNTER — PATIENT OUTREACH (OUTPATIENT)
Facility: HOSPITAL | Age: 54
End: 2024-09-03
Payer: COMMERCIAL

## 2024-09-03 DIAGNOSIS — Z12.39 ENCOUNTER FOR SCREENING FOR MALIGNANT NEOPLASM OF BREAST, UNSPECIFIED SCREENING MODALITY: Primary | ICD-10-CM

## 2024-09-16 ENCOUNTER — TELEPHONE (OUTPATIENT)
Dept: FAMILY MEDICINE | Facility: CLINIC | Age: 54
End: 2024-09-16
Payer: COMMERCIAL

## 2024-09-16 DIAGNOSIS — R92.8 ABNORMAL MAMMOGRAM OF RIGHT BREAST: Primary | ICD-10-CM

## 2024-09-16 NOTE — TELEPHONE ENCOUNTER
Spoke with patient regarding mammogram that was abnormal today. Requesting additional order for diagnostic mammogram with possible ultrasound to right breast. Request order to go to [facility].

## 2024-09-17 LAB
BCS RECOMMENDATION EXT: NORMAL
BCS RECOMMENDATION EXT: NORMAL

## 2024-09-19 ENCOUNTER — OFFICE VISIT (OUTPATIENT)
Dept: FAMILY MEDICINE | Facility: CLINIC | Age: 54
End: 2024-09-19
Payer: COMMERCIAL

## 2024-09-19 VITALS
WEIGHT: 199.19 LBS | OXYGEN SATURATION: 98 % | BODY MASS INDEX: 35.29 KG/M2 | HEART RATE: 76 BPM | DIASTOLIC BLOOD PRESSURE: 82 MMHG | TEMPERATURE: 99 F | SYSTOLIC BLOOD PRESSURE: 138 MMHG | HEIGHT: 63 IN

## 2024-09-19 DIAGNOSIS — I10 HYPERTENSION, UNSPECIFIED TYPE: Primary | ICD-10-CM

## 2024-09-19 DIAGNOSIS — L65.9 HAIR LOSS: ICD-10-CM

## 2024-09-19 LAB
ALBUMIN SERPL BCP-MCNC: 3.8 G/DL (ref 3.5–5)
ALBUMIN/GLOB SERPL: 0.9 {RATIO}
ALP SERPL-CCNC: 125 U/L (ref 41–108)
ALT SERPL W P-5'-P-CCNC: 34 U/L (ref 13–56)
ANION GAP SERPL CALCULATED.3IONS-SCNC: 11 MMOL/L (ref 7–16)
AST SERPL W P-5'-P-CCNC: 22 U/L (ref 15–37)
BASOPHILS # BLD AUTO: 0.04 K/UL (ref 0–0.2)
BASOPHILS NFR BLD AUTO: 0.4 % (ref 0–1)
BILIRUB SERPL-MCNC: 0.4 MG/DL (ref ?–1.2)
BUN SERPL-MCNC: 13 MG/DL (ref 7–18)
BUN/CREAT SERPL: 20 (ref 6–20)
CALCIUM SERPL-MCNC: 9.4 MG/DL (ref 8.5–10.1)
CHLORIDE SERPL-SCNC: 106 MMOL/L (ref 98–107)
CHOLEST SERPL-MCNC: 219 MG/DL (ref 0–200)
CHOLEST/HDLC SERPL: 3.1 {RATIO}
CO2 SERPL-SCNC: 27 MMOL/L (ref 21–32)
CREAT SERPL-MCNC: 0.65 MG/DL (ref 0.55–1.02)
DIFFERENTIAL METHOD BLD: ABNORMAL
EGFR (NO RACE VARIABLE) (RUSH/TITUS): 105 ML/MIN/1.73M2
EOSINOPHIL # BLD AUTO: 0.41 K/UL (ref 0–0.5)
EOSINOPHIL NFR BLD AUTO: 4.6 % (ref 1–4)
ERYTHROCYTE [DISTWIDTH] IN BLOOD BY AUTOMATED COUNT: 12.3 % (ref 11.5–14.5)
FSH SERPL-ACNC: 34.2 MIU/ML (ref 1.7–134.8)
GLOBULIN SER-MCNC: 4.3 G/DL (ref 2–4)
GLUCOSE SERPL-MCNC: 96 MG/DL (ref 74–106)
HCT VFR BLD AUTO: 43.1 % (ref 38–47)
HDLC SERPL-MCNC: 71 MG/DL (ref 40–60)
HGB BLD-MCNC: 13.8 G/DL (ref 12–16)
IMM GRANULOCYTES # BLD AUTO: 0.03 K/UL (ref 0–0.04)
IMM GRANULOCYTES NFR BLD: 0.3 % (ref 0–0.4)
LDLC SERPL CALC-MCNC: 136 MG/DL
LDLC/HDLC SERPL: 1.9 {RATIO}
LH SERPL-ACNC: 16.2 MIU/ML
LYMPHOCYTES # BLD AUTO: 2.61 K/UL (ref 1–4.8)
LYMPHOCYTES NFR BLD AUTO: 29.4 % (ref 27–41)
MCH RBC QN AUTO: 28.8 PG (ref 27–31)
MCHC RBC AUTO-ENTMCNC: 32 G/DL (ref 32–36)
MCV RBC AUTO: 89.8 FL (ref 80–96)
MONOCYTES # BLD AUTO: 0.75 K/UL (ref 0–0.8)
MONOCYTES NFR BLD AUTO: 8.4 % (ref 2–6)
MPC BLD CALC-MCNC: 10.3 FL (ref 9.4–12.4)
NEUTROPHILS # BLD AUTO: 5.05 K/UL (ref 1.8–7.7)
NEUTROPHILS NFR BLD AUTO: 56.9 % (ref 53–65)
NONHDLC SERPL-MCNC: 148 MG/DL
NRBC # BLD AUTO: 0 X10E3/UL
NRBC, AUTO (.00): 0 %
PLATELET # BLD AUTO: 350 K/UL (ref 150–400)
POTASSIUM SERPL-SCNC: 4.2 MMOL/L (ref 3.5–5.1)
PROT SERPL-MCNC: 8.1 G/DL (ref 6.4–8.2)
RBC # BLD AUTO: 4.8 M/UL (ref 4.2–5.4)
SODIUM SERPL-SCNC: 140 MMOL/L (ref 136–145)
T4 FREE SERPL-MCNC: 0.89 NG/DL (ref 0.76–1.46)
TRIGL SERPL-MCNC: 60 MG/DL (ref 35–150)
TSH SERPL DL<=0.005 MIU/L-ACNC: 2.4 UIU/ML (ref 0.36–3.74)
VLDLC SERPL-MCNC: 12 MG/DL
WBC # BLD AUTO: 8.89 K/UL (ref 4.5–11)

## 2024-09-19 PROCEDURE — 83001 ASSAY OF GONADOTROPIN (FSH): CPT | Mod: ,,, | Performed by: CLINICAL MEDICAL LABORATORY

## 2024-09-19 PROCEDURE — 80061 LIPID PANEL: CPT | Mod: ,,, | Performed by: CLINICAL MEDICAL LABORATORY

## 2024-09-19 PROCEDURE — 80050 GENERAL HEALTH PANEL: CPT | Mod: ,,, | Performed by: CLINICAL MEDICAL LABORATORY

## 2024-09-19 PROCEDURE — 82671 ASSAY OF ESTROGENS: CPT | Mod: 90,,, | Performed by: CLINICAL MEDICAL LABORATORY

## 2024-09-19 PROCEDURE — 3008F BODY MASS INDEX DOCD: CPT | Mod: CPTII,,, | Performed by: FAMILY MEDICINE

## 2024-09-19 PROCEDURE — 3075F SYST BP GE 130 - 139MM HG: CPT | Mod: CPTII,,, | Performed by: FAMILY MEDICINE

## 2024-09-19 PROCEDURE — 3079F DIAST BP 80-89 MM HG: CPT | Mod: CPTII,,, | Performed by: FAMILY MEDICINE

## 2024-09-19 PROCEDURE — 84439 ASSAY OF FREE THYROXINE: CPT | Mod: ,,, | Performed by: CLINICAL MEDICAL LABORATORY

## 2024-09-19 PROCEDURE — 1159F MED LIST DOCD IN RCRD: CPT | Mod: CPTII,,, | Performed by: FAMILY MEDICINE

## 2024-09-19 PROCEDURE — 83002 ASSAY OF GONADOTROPIN (LH): CPT | Mod: ,,, | Performed by: CLINICAL MEDICAL LABORATORY

## 2024-09-19 PROCEDURE — 99214 OFFICE O/P EST MOD 30 MIN: CPT | Mod: ,,, | Performed by: FAMILY MEDICINE

## 2024-09-19 NOTE — PROGRESS NOTES
Elan Blanton MD   [facility]  [address] [place] [address]     PATIENT NAME: Leny Desir  : 1970  DATE: 24  MRN: [id number]      Billing Provider: Elan Blanton MD  Level of Service: [place] OFFICE/OUTPT VISIT, EST, LEVL IV, 30-39 MIN  Patient PCP Information       Provider PCP Type    Elan Blanton MD General            Reason for Visit / Chief Complaint: Annual Exam (Annual check up and labs. Patient wants to check thyroid and hormones due to patient reporting hair loss, chronic dry lips, and growing hair on her chin. Takes estroven about 3-4 times a year for about a week for hot flashes. )         History of Present Illness / Problem Focused Workflow     Leny Desir presents to the clinic with Annual Exam (Annual check up and labs. Patient wants to check thyroid and hormones due to patient reporting hair loss, chronic dry lips, and growing hair on her chin. Takes estroven about 3-4 times a year for about a week for hot flashes. )     HPI    Review of Systems     Review of Systems   Constitutional:  Negative for activity change, appetite change, fatigue and fever.   HENT:  Negative for nasal congestion, ear pain, hearing loss, sinus pressure/congestion and sore throat.    Respiratory:  Negative for cough, chest tightness and shortness of breath.    Cardiovascular:  Negative for chest pain and palpitations.   Gastrointestinal:  Negative for abdominal pain and fecal incontinence.   Genitourinary:  Negative for bladder incontinence and difficulty urinating.   Musculoskeletal:  Negative for arthralgias.   Integumentary:  Negative for rash.   Neurological:  Negative for dizziness and headaches.        Medical / Social / Family History     Past Medical History:   Diagnosis Date    Allergy        Past Surgical History:   Procedure Laterality Date    HYSTERECTOMY      TUBAL LIGATION         Social History  Leny Desir  reports that she has never smoked. She  has never used smokeless tobacco. She reports that she does not drink alcohol and does not use drugs.    Family History  Leny Desir  family history includes Diabetes in her father; Hypertension in her mother.    Medications and Allergies     Medications  Outpatient Medications Marked as Taking for the 9/19/24 encounter (Office Visit) with Elan Blanton MD   Medication Sig Dispense Refill    rhubarb root extract (ESTROVEN CMPLT MENOPAUSE RLF) 4 mg Tab          Allergies  Review of patient's allergies indicates:   Allergen Reactions    Prednisone     Rocephin [ceftriaxone]     Sulfa (sulfonamide antibiotics)        Physical Examination     Vitals:    09/19/24 0759   BP: 138/82   Pulse: 76   Temp: 98.5 °F (36.9 °C)     Physical Exam  Constitutional:       General: She is not in acute distress.     Appearance: She is not ill-appearing.   HENT:      Head: Normocephalic and atraumatic.      Right Ear: Tympanic membrane and ear canal normal.      Left Ear: Tympanic membrane and ear canal normal.      Nose: Nose normal. No congestion or rhinorrhea.   Eyes:      Pupils: Pupils are equal, round, and reactive to light.   Cardiovascular:      Rate and Rhythm: Normal rate and regular rhythm.      Pulses: Normal pulses.      Heart sounds: No murmur heard.  Pulmonary:      Effort: No respiratory distress.      Breath sounds: No wheezing, rhonchi or rales.   Abdominal:      General: Bowel sounds are normal.      Palpations: Abdomen is soft.      Tenderness: There is no abdominal tenderness.      Hernia: No hernia is present.   Musculoskeletal:      Cervical back: Normal range of motion and neck supple.   Lymphadenopathy:      Cervical: No cervical adenopathy.   Skin:     General: Skin is warm and dry.   Neurological:      Mental Status: She is alert.   Psychiatric:         Behavior: Behavior normal.         Thought Content: Thought content normal.          Assessment and Plan (including Health Maintenance)   :    Plan:          Health Maintenance Due   Topic Date Due    Hepatitis C Screening  Never done    HIV Screening  Never done    Shingles Vaccine (1 of 2) Never done    Influenza Vaccine (1) Never done    COVID-19 Vaccine (1 - 2024-25 season) Never done       Problem List Items Addressed This Visit    None  Visit Diagnoses       Hypertension, unspecified type    -  Primary    Relevant Orders    CBC Auto Differential (Completed)    Comprehensive Metabolic Panel (Completed)    Lipid Panel (Completed)    Hair loss        Relevant Orders    TSH (Completed)    T4, Free (Completed)    Luteinizing Hormone (Completed)    Follicle Stimulating Hormone (Completed)    Estrogens, fractionated (Completed)          Hypertension, unspecified type  -     CBC Auto Differential; Future; Expected date: 09/19/2024  -     Comprehensive Metabolic Panel; Future; Expected date: 09/19/2024  -     Lipid Panel; Future; Expected date: 09/19/2024    Hair loss  -     TSH; Future; Expected date: 09/19/2024  -     T4, Free; Future; Expected date: 09/19/2024  -     Luteinizing Hormone; Future; Expected date: 09/19/2024  -     Follicle Stimulating Hormone; Future; Expected date: 09/19/2024  -     Estrogens, fractionated; Future; Expected date: 09/19/2024       Health Maintenance Topics with due status: Not Due       Topic Last Completion Date    TETANUS VACCINE 12/22/2020    Hemoglobin A1c (Diabetic Prevention Screening) 03/30/2023    Colorectal Cancer Screening 04/09/2023    Mammogram 09/18/2024    Lipid Panel 09/19/2024    RSV Vaccine (Age 60+ and Pregnant patients) Not Due       Procedures     No future appointments.       No follow-ups on file.       Signature:  Elan Blanton MD  [facility]  [address] [place] [address]  [phone] Phone  [phone] Fax    Date of encounter: 9/19/24

## 2024-09-24 ENCOUNTER — PATIENT OUTREACH (OUTPATIENT)
Facility: HOSPITAL | Age: 54
End: 2024-09-24
Payer: COMMERCIAL

## 2024-09-24 NOTE — PROGRESS NOTES
Breast Cancer Screening/diagnostic []  Mammogram Order Placed    []  Mammogram Screening Scheduled    []  External Records Requested & Care Team Updated if Applicable    [x]  External Records Uploaded & Care Team Updated if Applicable    [x]  Diagnostic mammogram right breast    []  Pt Will Schedule with External Provider / Order Routed & Care Team Updated if Applicable

## 2024-09-25 LAB
ESTRADIOL SERPL-MCNC: 21 PG/ML
ESTRONE SERPL-MCNC: 49 PG/ML

## 2025-07-21 ENCOUNTER — PATIENT MESSAGE (OUTPATIENT)
Dept: ADMINISTRATIVE | Facility: HOSPITAL | Age: 55
End: 2025-07-21